# Patient Record
Sex: MALE | Race: ASIAN | NOT HISPANIC OR LATINO | URBAN - METROPOLITAN AREA
[De-identification: names, ages, dates, MRNs, and addresses within clinical notes are randomized per-mention and may not be internally consistent; named-entity substitution may affect disease eponyms.]

---

## 2020-07-20 ENCOUNTER — VIRTUAL VISIT (OUTPATIENT)
Dept: FAMILY MEDICINE | Facility: OTHER | Age: 20
End: 2020-07-20

## 2020-07-21 DIAGNOSIS — R05.9 COUGH: Primary | ICD-10-CM

## 2020-07-21 PROCEDURE — U0003 INFECTIOUS AGENT DETECTION BY NUCLEIC ACID (DNA OR RNA); SEVERE ACUTE RESPIRATORY SYNDROME CORONAVIRUS 2 (SARS-COV-2) (CORONAVIRUS DISEASE [COVID-19]), AMPLIFIED PROBE TECHNIQUE, MAKING USE OF HIGH THROUGHPUT TECHNOLOGIES AS DESCRIBED BY CMS-2020-01-R: HCPCS | Performed by: FAMILY MEDICINE

## 2020-07-21 NOTE — LETTER
July 23, 2020        Davin Tucker  [address] [facility] [id number]    This letter provides a written record that you were tested for COVID-19 on 07/21/20.     Your result was positive. This means you have COVID-19 (coronavirus).    This means that we found the virus that causes COVID-19 in your sample.    How can I protect others?If you have symptoms, stay home and away from others (self-isolate) until:You ve had no fever--and no medicine that reduces fever--for 3 full days (72 hours). And      Your other symptoms have gotten better. For example, your cough or breathing has improved. And     At least 10 days have passed since your symptoms started.    If you don t have symptoms: Stay home and away from others (self-isolate) until at least 10 days have passed since your first positive COVID-19 test.    During this time:    Stay in your own room, including for meals. Use your own bathroom if you can.    Stay away from others in your home. No hugging, kissing or shaking hands. No visitors.     Don t go to work, school or anywhere else.     Clean  high touch  surfaces often (doorknobs, counters, handles, etc.). Use a household cleaning spray or wipes. You ll find a full list on the EPA website at www.epa.gov/pesticide-registration/list-n-disinfectants-use-against-sars-cov-2.     Cover your mouth and nose with a mask, tissue or washcloth to avoid spreading germs.    Wash your hands and face often with soap and water.    Caregivers in these groups are at risk for severe illness due to COVID-19:  o People 65 years and older  o People who live in a nursing home or long-term care facility  o People with chronic disease (lung, heart, cancer, diabetes, kidney, liver, immunologic)  o People who have a weakened immune system, including those who:  - Are in cancer treatment  - Take medicine that weakens the immune system, such as corticosteroids  - Had a bone marrow or organ transplant  - Have an immune  deficiency  - Have poorly controlled HIV or AIDS  - Are obese (body mass index of 40 or higher)  - Smoke regularly    Caregivers should wear gloves while washing dishes, handling laundry and cleaning bedrooms and bathrooms.    Wash and dry laundry with special caution. Don t shake dirty laundry, and use the warmest water setting you can.    If you have a weakened immune system, ask your doctor about other actions you should take.    For more tips, go to www.cdc.gov/coronavirus/2019-ncov/downloads/10Things.pdf.    You should not go back to work until you meet the guidelines above for ending your home isolation. You should meet these along with any other guidelines that your employer has.    Employers: This document serves as formal notice of your employee s medical guidelines for going back to work. They must meet the above guidelines before going back to work in person.    How can I take care of myself?    1. Get lots of rest. Drink extra fluids (unless a doctor has told you not to).    2. Take Tylenol (acetaminophen) for fever or pain. If you have liver or kidney problems, ask your family doctor if it s okay to take Tylenol.     Take either:     650 mg (two 325 mg pills) every 4 to 6 hours, or     1,000 mg (two 500 mg pills) every 8 hours as needed.     Note: Don t take more than 3,000 mg in one day. Acetaminophen is found in many medicines (both prescribed and over-the-counter medicines). Read all labels to be sure you don t take too much.    For children, check the Tylenol bottle for the right dose (based on their age or weight).    3. If you have other health problems (like cancer, heart failure, an organ transplant or severe kidney disease): Call your specialty clinic if you don t feel better in the next 2 days.    4. Know when to call 911: Emergency warning signs include:    Trouble breathing or shortness of breath    Pain or pressure in the chest that doesn t go away    Feeling confused like you haven t felt  before, or not being able to wake up    Bluish-colored lips or face    5. Sign up for [employer]. We know it s scary to hear that you have COVID-19. We want to track your symptoms to make sure you re okay over the next 2 weeks. Please look for an email from [employer]--this is a free, online program that we ll use to keep in touch. To sign up, follow the link in the email. Learn more at [email or web address].      Where can I get more information?    [facility]: [email or web address]    Coronavirus Basics: www.health.[facility].[place]/diseases/coronavirus/basics.html    What to Do If You re Sick: www.cdc.gov/coronavirus/2019-ncov/about/steps-when-sick.html    Ending Home Isolation: www.cdc.gov/coronavirus/2019-ncov/hcp/disposition-in-home-patients.html     Caring for Someone with COVID-19: www.cdc.gov/coronavirus/2019-ncov/if-you-are-sick/care-for-someone.html     [facility] clinical trials (COVID-19 research studies): clinicalaffairs.[facility]-clinical-trials

## 2020-07-22 LAB
SARS-COV-2 RNA SPEC QL NAA+PROBE: ABNORMAL
SPECIMEN SOURCE: ABNORMAL

## 2020-07-22 NOTE — PROGRESS NOTES
"Date: 2020 15:53:32  Clinician: Emelia Paez  Clinician NPI: [id number]  Patient: Davin Tucker  Patient : 2000  Patient Address: [address] [place] [id number]  Patient Phone: [phone]  Visit Protocol: URI  Patient Summary:  Davin is a 19 year old ( : 2000 ) male who initiated a Visit for COVID-19 (Coronavirus) evaluation and screening. When asked the question \"Please sign me up to receive news, health information and promotions. \", Davin responded \"No\".    Davin states his symptoms started gradually 7-9 days ago.   His symptoms consist of ageusia, malaise, a sore throat, and a cough. He is experiencing mild difficulty breathing with activities but can speak normally in full sentences.   Symptom details     Cough: Davin coughs a few times an hour and his cough is not more bothersome at night. Phlegm comes into his throat when he coughs. He believes his cough is caused by post-nasal drip. The color of the phlegm is yellow.     Sore throat: Davin reports having mild throat pain (1-3 on a 10 point pain scale), does not have exudate on his tonsils, and can swallow liquids. He is not sure if the lymph nodes in his neck are enlarged. A rash has appeared on the skin since the sore throat started. Davin uploaded photos of his rash (see below).      Davin denies having wheezing, nausea, teeth pain, diarrhea, vomiting, rhinitis, ear pain, headache, chills, myalgias, anosmia, facial pain or pressure, fever, and nasal congestion. He also denies having recent facial or sinus surgery in the past 60 days, taking antibiotic medication in the past month, and double sickening (worsening symptoms after initial improvement).   Precipitating events  Davin is not sure if he has been exposed to someone with strep throat. He has not recently been exposed to someone with influenza. Davin has been in close contact with the following high risk individuals: children under the age of 5, immunocompromised " people, and adults 65 or older.   Pertinent COVID-19 (Coronavirus) information  In the past 14 days, Davin has not worked in a congregate living setting.   He does not work or volunteer as healthcare worker or a  and does not work or volunteer in a healthcare facility.   Davin has lived in a congregate living setting in the past 14 days. He lives with a healthcare worker.   Davin has not had a close contact with a laboratory-confirmed COVID-19 patient within 14 days of symptom onset.   Pertinent medical history  Davin needs a return to work/school note.   Weight: 175 lbs   Davin smokes or uses smokeless tobacco.   Weight: 175 lbs    MEDICATIONS: No current medications, ALLERGIES: NKDA  Clinician Response:  Dear Davin,   Your symptoms show that you may have coronavirus (COVID-19). This illness can cause fever, cough and trouble breathing. Many people get a mild case and get better on their own. Some people can get very sick.  What should I do?  We would like to test you for this virus.   1. Please call [phone] to schedule your visit. Explain that you were referred by [facility] to have a COVID-19 test. Be ready to share your [facility] visit ID number.  The following will serve as your written order for this COVID Test, ordered by me, for the indication of suspected COVID [Z20.828]: The test will be ordered in [employer], our electronic health record, after you are scheduled. It will show as ordered and authorized by Jimy Tucker MD.  Order: COVID-19 (Coronavirus) PCR for SYMPTOMATIC testing from [facility].      2. When it's time for your COVID test:  Stay at least 6 feet away from others. (If someone will drive you to your test, stay in the backseat, as far away from the  as you can.)   Cover your mouth and nose with a mask, tissue or washcloth.  Go straight to the testing site. Don't make any stops on the way there or back.      3.Starting now: Stay home and away from others (self-isolate) until:   You've  "had no fever---and no medicine that reduces fever---for 3 full days (72 hours). And...   Your other symptoms have gotten better. For example, your cough or breathing has improved. And...   At least 10 days have passed since your symptoms started.       During this time, don't leave the house except for testing or medical care.   Stay in your own room, even for meals. Use your own bathroom if you can.   Stay away from others in your home. No hugging, kissing or shaking hands. No visitors.  Don't go to work, school or anywhere else.    Clean \"high touch\" surfaces often (doorknobs, counters, handles, etc.). Use a household cleaning spray or wipes. You'll find a full list of  on the EPA website: www.epa.gov/pesticide-registration/list-n-disinfectants-use-against-sars-cov-2.   Cover your mouth and nose with a mask, tissue or washcloth to avoid spreading germs.  Wash your hands and face often. Use soap and water.  Caregivers in these groups are at risk for severe illness due to COVID-19:  o People 65 years and older  o People who live in a nursing home or long-term care facility  o People with chronic disease (lung, heart, cancer, diabetes, kidney, liver, immunologic)  o People who have a weakened immune system, including those who:   Are in cancer treatment  Take medicine that weakens the immune system, such as corticosteroids  Had a bone marrow or organ transplant  Have an immune deficiency  Have poorly controlled HIV or AIDS  Are obese (body mass index of 40 or higher)  Smoke regularly   o Caregivers should wear gloves while washing dishes, handling laundry and cleaning bedrooms and bathrooms.  o Use caution when washing and drying laundry: Don't shake dirty laundry, and use the warmest water setting that you can.  o For more tips, go to www.cdc.gov/coronavirus/2019-ncov/downloads/10Things.pdf.    4.Sign up for [employer]. We know it's scary to hear that you might have COVID-19. We want to track your symptoms " to make sure you're okay over the next 2 weeks. Please look for an email from [email or web address]---this is a free, online program that we'll use to keep in touch. To sign up, follow the link in the email. Learn more at [email or web address]  How can I take care of myself?   Get lots of rest. Drink extra fluids (unless a doctor has told you not to).   Take Tylenol (acetaminophen) for fever or pain. If you have liver or kidney problems, ask your family doctor if it's okay to take Tylenol.   Adults can take either:    650 mg (two 325 mg pills) every 4 to 6 hours, or...   1,000 mg (two 500 mg pills) every 8 hours as needed.    Note: Don't take more than 3,000 mg in one day. Acetaminophen is found in many medicines (both prescribed and over-the-counter medicines). Read all labels to be sure you don't take too much.   For children, check the Tylenol bottle for the right dose. The dose is based on the child's age or weight.    If you have other health problems (like cancer, heart failure, an organ transplant or severe kidney disease): Call your specialty clinic if you don't feel better in the next 2 days.       Know when to call 911. Emergency warning signs include:    Trouble breathing or shortness of breath Pain or pressure in the chest that doesn't go away Feeling confused like you haven't felt before, or not being able to wake up Bluish-colored lips or face.  Where can I get more information?   [facility] -- About COVID-19: [email or web address]   CDC -- What to Do If You're Sick: www.cdc.gov/coronavirus/2019-ncov/about/steps-when-sick.html   CDC -- Ending Home Isolation: www.cdc.gov/coronavirus/2019-ncov/hcp/disposition-in-home-patients.html   CDC -- Caring for Someone: www.cdc.gov/coronavirus/2019-ncov/if-you-are-sick/care-for-someone.html   [facility] -- Interim Guidance for Hospital Discharge to Home: www.health.[facility].[place]./diseases/coronavirus/hcp/hospdischarge.pdf   [facility]  clinical trials (COVID-19 research studies): clinicalaffairs.[facility]-clinical-trials    Below are the COVID-19 hotlines at the [place] Department of Health ([facility]). Interpreters are available.    For health questions: Call 119-991-7729 or 1-360.574.1497 (7 a.m. to 7 p.m.) For questions about schools and childcare: Call 048-893-4994 or 1-253.863.3382 (7 a.m. to 7 p.m.)    COVID-19 (Coronavirus) General Information  Because there is currently no vaccine to prevent infection, the best way to protect yourself is to avoid being exposed to this virus. Common symptoms of COVID-19 include but are not limited to fever, cough, and shortness of breath. These symptoms appear 2-14 days after you are exposed to the virus that causes COVID-19. Click here for more information from the CDC on how to protect yourself.  If you are sick with COVID-19 or suspect you are infected with the virus that causes COVID-19, follow the steps here from the CDC to help prevent the disease from spreading to people in your home and community.  Click here for general information from the CDC on testing.  If you develop any of these emergency warning signs for COVID-19, get medical attention immediately:     Trouble breathing    Persistent pain or pressure in the chest    New confusion or inability to arouse    Bluish lips or face      Call your doctor or clinic before going in. Call [phone] if you have a medical emergency and notify the  you have or think you may have COVID-19.  For more detailed and up to date information on COVID-19 (Coronavirus), please visit the CDC website.   Diagnosis: Cough  Diagnosis ICD: R05

## 2020-07-23 ENCOUNTER — TELEPHONE (OUTPATIENT)
Dept: URGENT CARE | Facility: URGENT CARE | Age: 20
End: 2020-07-23

## 2020-07-23 NOTE — TELEPHONE ENCOUNTER
"Coronavirus (COVID-19) Notification    Caller Name (Patient, parent, daughter/son, grandparent, etc)  Davin Tucker (Patient)    Reason for call  Notify of Positive Coronavirus (COVID-19) lab results, assess symptoms,  review  [facility] recommendations    Lab Result    Lab test:  2019-nCoV rRt-PCR or SARS-CoV-2 PCR    Oropharyngeal AND/OR nasopharyngeal swabs is POSITIVE for 2019-nCoV RNA/SARS-COV-2 PCR (COVID-19 virus)    RN Recommendations/Instructions per [facility] Coronavirus COVID-19 recommendations    Brief introduction script  Introduce self then review script:  \"I am calling on behalf of [email or web address].  We were notified that your Coronavirus test (COVID-19) for was POSITIVE for the virus.  I have some information to relay to you but first I wanted to mention that the [place] Dept of Health will be contacting you shortly [it's possible MD already called Patient] to talk to you more about how you are feeling and other people you have had contact with who might now also have the virus.  Also,  [email or web address] is Partnering with the [facility] for Covid-19 research, you may be contacted directly by research staff.\"    Assessment (Inquire about Patient's current symptoms)   Assessment   Current Symptoms at time of phone call: (if no symptoms, document No symptoms] Feeling great today. No lingering symptoms.   Symptoms onset (if applicable) About 1.5 weeks ago (pt did not know exact date of onset)     If at time of call, Patients symptoms hare worsened, the Patient should contact 911 or have someone drive them to Emergency Dept promptly:      If Patient calling 911, inform 911 personal that you have tested positive for the Coronavirus (COVID-19).  Place mask on and await 911 to arrive.    If Emergency Dept, If possible, please have another adult drive you to the Emergency Dept but you need to wear mask when in contact with other people.      Review information with Patient    Your result was " positive. This means you have COVID-19 (coronavirus).  We have sent you a letter that reviews the information that I'll be reviewing with you now.    How can I protect others?    If you have symptoms: stay home and away from others (self-isolate) until:    You've had no fever--and no medicine that reduces fever--for 3 full days (72 hours). And      Your other symptoms have gotten better. For example, your cough or breathing has improved. And     At least 10 days have passed since your symptoms started.    If you don't have symptoms: Stay home and away from others (self-isolate) until at least 10 days have passed since your first positive COVID-19 test. (Date test collected)    During this time:    Stay in your own room, including for meals. Use your own bathroom if you can.    Stay away from others in your home. No hugging, kissing or shaking hands. No visitors.     Don't go to work, school or anywhere else.     Clean  high touch  surfaces often (doorknobs, counters, handles, etc.). Use a household cleaning spray or wipes. You'll find a full list on the EPA website at www.epa.gov/pesticide-registration/list-n-disinfectants-use-against-sars-cov-2.     Cover your mouth and nose with a mask, tissue or washcloth to avoid spreading germs.    Wash your hands and face often with soap and water.    Caregivers in these groups are at risk for severe illness due to COVID-19:  o People 65 years and older  o People who live in a nursing home or long-term care facility  o People with chronic disease (lung, heart, cancer, diabetes, kidney, liver, immunologic)  o People who have a weakened immune system, including those who:  - Are in cancer treatment  - Take medicine that weakens the immune system, such as corticosteroids  - Had a bone marrow or organ transplant  - Have an immune deficiency  - Have poorly controlled HIV or AIDS  - Are obese (body mass index of 40 or higher)  - Smoke regularly    Caregivers should wear gloves while  washing dishes, handling laundry and cleaning bedrooms and bathrooms.    Wash and dry laundry with special caution. Don't shake dirty laundry, and use the warmest water setting you can.    If you have a weakened immune system, ask your doctor about other actions you should take.    For more tips, go to www.cdc.gov/coronavirus/2019-ncov/downloads/10Things.pdf.    You should not go back to work until you meet the guidelines above for ending your home isolation. You should meet these along with any other guidelines that your employer has.    Employers: This document serves as formal notice of your employee's medical guidelines for going back to work. They must meet the above guidelines before going back to work in person.    How can I take care of myself?    1. Get lots of rest. Drink extra fluids (unless a doctor has told you not to).    2. Take Tylenol (acetaminophen) for fever or pain. If you have liver or kidney problems, ask your family doctor if it's okay to take Tylenol.     Take either:     650 mg (two 325 mg pills) every 4 to 6 hours, or     1,000 mg (two 500 mg pills) every 8 hours as needed.     Note: Don't take more than 3,000 mg in one day. Acetaminophen is found in many medicines (both prescribed and over-the-counter medicines). Read all labels to be sure you don't take too much.    For children, check the Tylenol bottle for the right dose (based on their age or weight).    3. If you have other health problems (like cancer, heart failure, an organ transplant or severe kidney disease): Call your specialty clinic if you don't feel better in the next 2 days.    4. Know when to call 911: Emergency warning signs include:    Trouble breathing or shortness of breath    Pain or pressure in the chest that doesn't go away    Feeling confused like you haven't felt before, or not being able to wake up    Bluish-colored lips or face    5. Sign up for [employer]. We know it's scary to hear that you have COVID-19. We  want to track your symptoms to make sure you're okay over the next 2 weeks. Please look for an email from [employer]--this is a free, online program that we'll use to keep in touch. To sign up, follow the link in the email. Learn more at [email or web address].    Where can I get more information?    [facility]: [email or web address]    Coronavirus Basics: www.health.[facility].mn./diseases/coronavirus/basics.html    What to Do If You're Sick: www.cdc.gov/coronavirus/2019-ncov/about/steps-when-sick.html    Ending Home Isolation: www.cdc.gov/coronavirus/2019-ncov/hcp/disposition-in-home-patients.html     Caring for Someone with COVID-19: www.cdc.gov/coronavirus/2019-ncov/if-you-are-sick/care-for-someone.html     [facility] clinical trials (COVID-19 research studies): clinicalaffairs.[facility]/n-clinical-trials     Positive COVID-19 letter sent via [employer] or mail (Yes/No):  Yes     [Name]  MARLIN PierceN, RN

## 2020-11-22 ENCOUNTER — HEALTH MAINTENANCE LETTER (OUTPATIENT)
Age: 20
End: 2020-11-22

## 2021-05-05 ENCOUNTER — OFFICE VISIT - HEALTHEAST (OUTPATIENT)
Dept: FAMILY MEDICINE | Facility: CLINIC | Age: 21
End: 2021-05-05

## 2021-05-05 DIAGNOSIS — S42.022A DISPLACED FRACTURE OF SHAFT OF LEFT CLAVICLE, INITIAL ENCOUNTER FOR CLOSED FRACTURE: ICD-10-CM

## 2021-05-05 DIAGNOSIS — M89.8X1 PAIN OF LEFT CLAVICLE: ICD-10-CM

## 2021-05-05 DIAGNOSIS — M25.512 ACUTE PAIN OF LEFT SHOULDER: ICD-10-CM

## 2021-05-13 ENCOUNTER — RECORDS - HEALTHEAST (OUTPATIENT)
Dept: LAB | Facility: CLINIC | Age: 21
End: 2021-05-13

## 2021-05-13 LAB
SARS-COV-2 PCR COMMENT: NORMAL
SARS-COV-2 RNA SPEC QL NAA+PROBE: NEGATIVE
SARS-COV-2 VIRUS SPECIMEN SOURCE: NORMAL

## 2021-05-27 ENCOUNTER — RECORDS - HEALTHEAST (OUTPATIENT)
Dept: ADMINISTRATIVE | Facility: CLINIC | Age: 21
End: 2021-05-27

## 2021-05-27 VITALS
DIASTOLIC BLOOD PRESSURE: 85 MMHG | SYSTOLIC BLOOD PRESSURE: 148 MMHG | RESPIRATION RATE: 16 BRPM | OXYGEN SATURATION: 98 % | TEMPERATURE: 98 F | WEIGHT: 174.3 LBS | HEART RATE: 84 BPM

## 2021-06-18 NOTE — PATIENT INSTRUCTIONS - HE
Patient Instructions by Rj Chamberlain PA-C at 5/5/2021  2:50 PM     Author: Rj Chamberlain PA-C Service: -- Author Type: Physician Assistant    Filed: 5/5/2021  3:53 PM Encounter Date: 5/5/2021 Status: Addendum    : Rj Chamberlain PA-C (Physician Assistant)    Related Notes: Original Note by Rj Chamberlain PA-C (Physician Assistant) filed at 5/5/2021  3:52 PM       Sling for comfort.  You may follow-up with orthopedics at the [facility] walk-in clinic for pain control and early evaluation.    Narcotics Discharge Instructions: (Roxicodone, Dilaudid, Ultram)  You have been prescribed a narcotic pain medication that has risk for addiction with prolonged use, so please use sparingly.  Additionally, narcotics are medications that are sedating (will make you sleepy), so do not drive or operate machinery while taking this medication.  Avoid alcohol or other sedating medicines such as benzodiazepines while taking narcotics due to risk for increased sedation and difficulty breathing.      Narcotics will cause constipation.  If you need to take this medication please consider taking an over-the-counter stool softener and laxative, such as Senna Plus, to prevent constipation from developing.  Nausea is a side effect of narcotic use.  Possible additional side effects include vomiting, itching, and dizziness/lightheadedness.        Patient Education     Understanding a Clavicle Fracture     A fracture of the clavicle is a broken collarbone. This bone runs horizontally from the shoulder blade to the top of the breastbone. Clavicle fractures occur most often along the middle of the bone. Less often, they occur at the ends of the bone. The bone may be cracked, or it may be broken into 2 or more pieces. The pieces may be lined up or they may have moved out of place. Sometimes, the bone may break through the skin. Depending on how badly the bone is broken, healing may take a few months or longer.  What causes a clavicle  fracture?  Clavicle fractures are common, especially in children and teens. They are often caused from a blow to the shoulder. They can also be caused from a fall, accident, or sports injury.  Symptoms of a clavicle fracture  Symptoms can include:    Pain    Swelling    Bruising    Bleeding, if the bone breaks through the skin    Sagging of the shoulder    Taut skin, deformity, or bump that can be seen or felt where the bone is broken    A grinding or crackling feeling when moving the shoulder    Trouble moving or using the arm or shoulder  Treating a clavicle fracture    Treatment depends on where the bone is broken and how serious the break is. If needed, the bone is put back into place. This may be done with or without surgery. If surgery is needed, the surgeon may use devices such as pins, plates, or screws to hold the bone together. You may need a sling or elastic bandage to keep the bone in place and protect it from injury during healing. Other treatments may also be used to help reduce symptoms or regain function. These include:    Cold packs. Putting an ice pack on the injured area may help reduce swelling, bruising, and pain.    Pain medicines. Prescription or over-the-counter pain medicines may help reduce pain and swelling.    Limits on activity. You may need to avoid lifting or driving, or movements such as reaching and pulling until the bone has healed.    Exercises. You may be given certain exercises to do at home or with a physical therapist. These help improve strength, flexibility, and range of motion in the injured arm and shoulder.  Possible complications of a clavicle fracture  These can include:    Poor healing of the bone    Bump that can be seen or felt even after the bone has healed    Weakness, stiffness, or loss of range of motion in the arm and shoulder    Osteoarthritis in the joints at either end     When to call your healthcare provider  Call your healthcare provider right away if you  have any of these:    Fever of 100.4 F (38 C) or higher, or as directed    Symptoms that dont get better with treatment, or get worse    Numbness, tingling, coldness, or swelling in your arm, hand, or fingers    A sling that is damaged or feels too tight or loose    Unusual redness, warmth, swelling, bleeding, or drainage from any open wounds or incision sites    New symptoms   Date Last Reviewed: 3/10/2016    2297-8352 [employer]. [address] [place] [address]. All rights reserved. This information is not intended as a substitute for professional medical care. Always follow your healthcare professional's instructions.

## 2021-06-30 NOTE — PROGRESS NOTES
Progress Notes by Rj Chamberlain PA-C at 5/5/2021  2:50 PM     Author: Rj Cahmberlain PA-C Service: -- Author Type: Physician Assistant    Filed: 5/5/2021  4:37 PM Encounter Date: 5/5/2021 Status: Signed    : Rj Chamberlain PA-C (Physician Assistant)       Chief Complaint   Patient presents with   ? Fall     fell off board today, having Lt shoulder pain, unable to move shoulder         Clinical Decision Making:  Patient has a significant amount of overlap of the clavicle as well as shortening.  I do think the patient may benefit from evaluation by orthopedics.  At this time there was no concern for tenting or breaking of the skin or difficulty with the nerve.  However there is significant shortening and overlap and it is not in a position that would most likely allow optimal healing.  He is directed to go to the [facility] quick walk-in clinic at [place] [facility].  They will be able to see the x-rays through our system.  Patient is placed in a sling and swath help immobilize the shoulder.  He is also given a small amount of pain medication to help with pain control.  Will present to the [facility] for reevaluation and treatment of the clavicle fracture.  Questions were answered patient satisfaction for discharge.    1. Displaced fracture of shaft of left clavicle, initial encounter for closed fracture  Ambulatory referral to Orthopedics    traMADoL (ULTRAM) 50 mg tablet   2. Acute pain of left shoulder  XR Clavicle Left   3. Pain of left clavicle  XR Clavicle Left    Ambulatory referral to Orthopedics    traMADoL (ULTRAM) 50 mg tablet         Patient Instructions     Sling for comfort.  You [facility] for pain control and early evaluation.    Narcotics Discharge Instructions: (Roxicodone, Dilaudid, Ultram)  You have been prescribed a narcotic pain medication that has risk for addiction with prolonged use, so please use sparingly.  Additionally, narcotics are  medications that are sedating (will make you sleepy), so do not drive or operate machinery while taking this medication.  Avoid alcohol or other sedating medicines such as benzodiazepines while taking narcotics due to risk for increased sedation and difficulty breathing.      Narcotics will cause constipation.  If you need to take this medication please consider taking an over-the-counter stool softener and laxative, such as Senna Plus, to prevent constipation from developing.  Nausea is a side effect of narcotic use.  Possible additional side effects include vomiting, itching, and dizziness/lightheadedness.        Patient Education     Understanding a Clavicle Fracture     A fracture of the clavicle is a broken collarbone. This bone runs horizontally from the shoulder blade to the top of the breastbone. Clavicle fractures occur most often along the middle of the bone. Less often, they occur at the ends of the bone. The bone may be cracked, or it may be broken into 2 or more pieces. The pieces may be lined up or they may have moved out of place. Sometimes, the bone may break through the skin. Depending on how badly the bone is broken, healing may take a few months or longer.  What causes a clavicle fracture?  Clavicle fractures are common, especially in children and teens. They are often caused from a blow to the shoulder. They can also be caused from a fall, accident, or sports injury.  Symptoms of a clavicle fracture  Symptoms can include:    Pain    Swelling    Bruising    Bleeding, if the bone breaks through the skin    Sagging of the shoulder    Taut skin, deformity, or bump that can be seen or felt where the bone is broken    A grinding or crackling feeling when moving the shoulder    Trouble moving or using the arm or shoulder  Treating a clavicle fracture    Treatment depends on where the bone is broken and how serious the break is. If needed, the bone is put back into place. This may be done with or without  surgery. If surgery is needed, the surgeon may use devices such as pins, plates, or screws to hold the bone together. You may need a sling or elastic bandage to keep the bone in place and protect it from injury during healing. Other treatments may also be used to help reduce symptoms or regain function. These include:    Cold packs. Putting an ice pack on the injured area may help reduce swelling, bruising, and pain.    Pain medicines. Prescription or over-the-counter pain medicines may help reduce pain and swelling.    Limits on activity. You may need to avoid lifting or driving, or movements such as reaching and pulling until the bone has healed.    Exercises. You may be given certain exercises to do at home or with a physical therapist. These help improve strength, flexibility, and range of motion in the injured arm and shoulder.  Possible complications of a clavicle fracture  These can include:    Poor healing of the bone    Bump that can be seen or felt even after the bone has healed    Weakness, stiffness, or loss of range of motion in the arm and shoulder    Osteoarthritis in the joints at either end     When to call your healthcare provider  Call your healthcare provider right away if you have any of these:    Fever of 100.4 F (38 C) or higher, or as directed    Symptoms that dont get better with treatment, or get worse    Numbness, tingling, coldness, or swelling in your arm, hand, or fingers    A sling that is damaged or feels too tight or loose    Unusual redness, warmth, swelling, bleeding, or drainage from any open wounds or incision sites    New symptoms   Date Last Reviewed: 3/10/2016    7761-8632 [employer]. [address] [place] [address]. All rights reserved. This information is not intended as a substitute for professional medical care. Always follow your healthcare professional's instructions.                HPI:  Davin Tucker is a 21 y.o. male who presents today after  sustaining an injury while riding his long board.  Patient states he had quite a good speed on his long board.  He fell onto the left point of the shoulder.  Sequently has pain and deformity in the middle third of the left clavicle.  Not have pain at the proximal portion of the humerus midshaft or at the elbow of the left upper extremity or in the forearm wrist or hand.  Patient is a right-hand-dominant male.  He has no head neck back contralateral right upper extremity or bilateral lower extremity or pelvic injuries to report. At this time, he is full weightbearing on the lower extremities.  At this time, he does not have any break in the skin or bleeding and no ecchymosis.  He is neurovascularly intact to light touch in all dermatomes left upper extremity.    History obtained from the patient.    Problem List:  There are no relevant problems documented for this patient.      No past medical history on file.    Social History     Tobacco Use   ? Smoking status: Former Smoker   ? Smokeless tobacco: Never Used   ? Tobacco comment: vaping   Substance Use Topics   ? Alcohol use: Not on file       Review of Systems  As above in HPI, otherwise balance of Review of Systems are negative.    Vitals:    05/05/21 1455 05/05/21 1526   BP: (!) 145/97 148/85   Pulse: 84    Resp: 16    Temp: 98  F (36.7  C)    TempSrc: Oral    SpO2: 98%    Weight: 174 lb 4.8 oz (79.1 kg)        Physical Exam    General: Patient is uncomfortable and in pain from the left clavicle injury.  Patient is holding his left elbow in flexion and not moving the left shoulder.  HEENT: Head is normocephalic atraumatic no noted crepitus on the scalp or skull.  Skin: Without rash non-diaphoretic  Musculoskeletal: Patient does not have pain over the AC joint.  The sternoclavicular joint is nontender to palpation.  The mid one third of the clavicle is with obvious deformity.  Pressure in this area does reproduce his symptoms.  Is very painful.  There is no break  in the skin or tenting.  Does not have any neurovascular deficits in the left upper extremity.  No noted pain over the spine of the left scapula.  Examination of the humerus shows that there is gentle internal and external rotation is well-tolerated.  No pain over the head of the humerus the midshaft or the distal end of the humerus or into the elbow.  No forearm or wrist or hand pain of the lateral side with negative anatomic snuffbox tenderness over the scaphoid of the left hand.  Neck is nontender palpation full range of motion right shoulder right upper extremity and bilateral lower extremities are not involved none tender and joints without effusion.      Radiology:  I have personally ordered and preliminarily reviewed the following xray, I have noted a shorted and mildly commuted middle one third clavicle shaft fracture with apex cephalic.    Xr Clavicle Left    Result Date: 5/5/2021  EXAM DATE:         05/05/2021 EXAM: X-RAY CLAVICLE LEFT LOCATION: [facility] DATE/TIME: 5/5/2021 3:45 PM INDICATION: left middle 1/3 clavicle fracture/pain/deformity COMPARISON: None. IMPRESSION: Mildly comminuted fracture of the left clavicular shaft near and distal to the midshaft level, the main distal fragment is displaced inferiorly by greater than one bone width, and there is mild overriding of the fragments.

## 2021-07-04 NOTE — ADDENDUM NOTE
Addendum Note by Kennedy Iqbal PA-C at 5/5/2021  2:50 PM     Author: Kennedy Iqbal PA-C Service: -- Author Type: Physician Assistant    Filed: 5/5/2021  4:38 PM Encounter Date: 5/5/2021 Status: Signed    : Kennedy Iqbal PA-C (Physician Assistant)    Addended by: KENNEDY IQBAL on: 5/5/2021 04:38 PM        Modules accepted: Level of Service

## 2021-09-19 ENCOUNTER — HEALTH MAINTENANCE LETTER (OUTPATIENT)
Age: 21
End: 2021-09-19

## 2022-01-09 ENCOUNTER — HEALTH MAINTENANCE LETTER (OUTPATIENT)
Age: 22
End: 2022-01-09

## 2022-11-21 ENCOUNTER — HEALTH MAINTENANCE LETTER (OUTPATIENT)
Age: 22
End: 2022-11-21

## 2023-04-16 ENCOUNTER — HEALTH MAINTENANCE LETTER (OUTPATIENT)
Age: 23
End: 2023-04-16